# Patient Record
Sex: MALE | Race: WHITE | Employment: OTHER | URBAN - METROPOLITAN AREA
[De-identification: names, ages, dates, MRNs, and addresses within clinical notes are randomized per-mention and may not be internally consistent; named-entity substitution may affect disease eponyms.]

---

## 2022-09-16 ENCOUNTER — APPOINTMENT (OUTPATIENT)
Dept: GENERAL RADIOLOGY | Age: 57
End: 2022-09-16
Attending: STUDENT IN AN ORGANIZED HEALTH CARE EDUCATION/TRAINING PROGRAM
Payer: COMMERCIAL

## 2022-09-16 ENCOUNTER — APPOINTMENT (OUTPATIENT)
Dept: ULTRASOUND IMAGING | Age: 57
End: 2022-09-16
Attending: STUDENT IN AN ORGANIZED HEALTH CARE EDUCATION/TRAINING PROGRAM
Payer: COMMERCIAL

## 2022-09-16 ENCOUNTER — HOSPITAL ENCOUNTER (EMERGENCY)
Age: 57
Discharge: LEFT BEFORE DISCHARGE | End: 2022-09-16
Attending: STUDENT IN AN ORGANIZED HEALTH CARE EDUCATION/TRAINING PROGRAM | Admitting: STUDENT IN AN ORGANIZED HEALTH CARE EDUCATION/TRAINING PROGRAM
Payer: COMMERCIAL

## 2022-09-16 VITALS
RESPIRATION RATE: 16 BRPM | OXYGEN SATURATION: 98 % | HEART RATE: 79 BPM | SYSTOLIC BLOOD PRESSURE: 117 MMHG | HEIGHT: 68 IN | WEIGHT: 190 LBS | BODY MASS INDEX: 28.79 KG/M2 | DIASTOLIC BLOOD PRESSURE: 68 MMHG | TEMPERATURE: 98.7 F

## 2022-09-16 DIAGNOSIS — A41.9 SEVERE SEPSIS (HCC): ICD-10-CM

## 2022-09-16 DIAGNOSIS — R65.20 SEVERE SEPSIS (HCC): ICD-10-CM

## 2022-09-16 DIAGNOSIS — K81.9 CHOLECYSTITIS: Primary | ICD-10-CM

## 2022-09-16 DIAGNOSIS — R50.9 FEVER, UNSPECIFIED FEVER CAUSE: ICD-10-CM

## 2022-09-16 LAB
ALBUMIN SERPL-MCNC: 3.1 G/DL (ref 3.4–5)
ALBUMIN/GLOB SERPL: 0.9 {RATIO} (ref 0.8–1.7)
ALP SERPL-CCNC: 161 U/L (ref 45–117)
ALT SERPL-CCNC: 378 U/L (ref 16–61)
ANION GAP SERPL CALC-SCNC: 5 MMOL/L (ref 3–18)
APPEARANCE UR: CLEAR
AST SERPL-CCNC: 80 U/L (ref 10–38)
B PERT DNA SPEC QL NAA+PROBE: NOT DETECTED
BACTERIA URNS QL MICRO: ABNORMAL /HPF
BASOPHILS # BLD: 0 K/UL (ref 0–0.1)
BASOPHILS NFR BLD: 0 % (ref 0–2)
BILIRUB SERPL-MCNC: 0.9 MG/DL (ref 0.2–1)
BILIRUB UR QL: ABNORMAL
BORDETELLA PARAPERTUSSIS PCR, BORPAR: NOT DETECTED
BUN SERPL-MCNC: 13 MG/DL (ref 7–18)
BUN/CREAT SERPL: 13 (ref 12–20)
C PNEUM DNA SPEC QL NAA+PROBE: NOT DETECTED
CALCIUM SERPL-MCNC: 8.5 MG/DL (ref 8.5–10.1)
CHLORIDE SERPL-SCNC: 101 MMOL/L (ref 100–111)
CO2 SERPL-SCNC: 28 MMOL/L (ref 21–32)
COLOR UR: ABNORMAL
CREAT SERPL-MCNC: 1 MG/DL (ref 0.6–1.3)
DIFFERENTIAL METHOD BLD: ABNORMAL
EOSINOPHIL # BLD: 0 K/UL (ref 0–0.4)
EOSINOPHIL NFR BLD: 1 % (ref 0–5)
EPITH CASTS URNS QL MICRO: ABNORMAL /LPF (ref 0–5)
ERYTHROCYTE [DISTWIDTH] IN BLOOD BY AUTOMATED COUNT: 12.4 % (ref 11.6–14.5)
FLUAV SUBTYP SPEC NAA+PROBE: NOT DETECTED
FLUBV RNA SPEC QL NAA+PROBE: NOT DETECTED
GLOBULIN SER CALC-MCNC: 3.6 G/DL (ref 2–4)
GLUCOSE SERPL-MCNC: 208 MG/DL (ref 74–99)
GLUCOSE UR STRIP.AUTO-MCNC: 500 MG/DL
HADV DNA SPEC QL NAA+PROBE: NOT DETECTED
HCOV 229E RNA SPEC QL NAA+PROBE: NOT DETECTED
HCOV HKU1 RNA SPEC QL NAA+PROBE: NOT DETECTED
HCOV NL63 RNA SPEC QL NAA+PROBE: NOT DETECTED
HCOV OC43 RNA SPEC QL NAA+PROBE: NOT DETECTED
HCT VFR BLD AUTO: 43.4 % (ref 36–48)
HGB BLD-MCNC: 14.9 G/DL (ref 13–16)
HGB UR QL STRIP: NEGATIVE
HMPV RNA SPEC QL NAA+PROBE: NOT DETECTED
HPIV1 RNA SPEC QL NAA+PROBE: NOT DETECTED
HPIV2 RNA SPEC QL NAA+PROBE: NOT DETECTED
HPIV3 RNA SPEC QL NAA+PROBE: NOT DETECTED
HPIV4 RNA SPEC QL NAA+PROBE: NOT DETECTED
HYALINE CASTS URNS QL MICRO: ABNORMAL /LPF (ref 0–2)
IMM GRANULOCYTES # BLD AUTO: 0 K/UL (ref 0–0.04)
IMM GRANULOCYTES NFR BLD AUTO: 1 % (ref 0–0.5)
KETONES UR QL STRIP.AUTO: ABNORMAL MG/DL
LACTATE SERPL-SCNC: 0.7 MMOL/L (ref 0.4–2)
LEUKOCYTE ESTERASE UR QL STRIP.AUTO: ABNORMAL
LIPASE SERPL-CCNC: 534 U/L (ref 73–393)
LYMPHOCYTES # BLD: 0.6 K/UL (ref 0.9–3.6)
LYMPHOCYTES NFR BLD: 14 % (ref 21–52)
M PNEUMO DNA SPEC QL NAA+PROBE: NOT DETECTED
MCH RBC QN AUTO: 31.5 PG (ref 24–34)
MCHC RBC AUTO-ENTMCNC: 34.3 G/DL (ref 31–37)
MCV RBC AUTO: 91.8 FL (ref 78–100)
MONOCYTES # BLD: 0.4 K/UL (ref 0.05–1.2)
MONOCYTES NFR BLD: 10 % (ref 3–10)
MUCOUS THREADS URNS QL MICRO: ABNORMAL /LPF
NEUTS SEG # BLD: 3 K/UL (ref 1.8–8)
NEUTS SEG NFR BLD: 75 % (ref 40–73)
NITRITE UR QL STRIP.AUTO: NEGATIVE
NRBC # BLD: 0 K/UL (ref 0–0.01)
NRBC BLD-RTO: 0 PER 100 WBC
PH UR STRIP: 6 [PH] (ref 5–8)
PLATELET # BLD AUTO: 86 K/UL (ref 135–420)
PMV BLD AUTO: 9.2 FL (ref 9.2–11.8)
POTASSIUM SERPL-SCNC: 3.4 MMOL/L (ref 3.5–5.5)
PROT SERPL-MCNC: 6.7 G/DL (ref 6.4–8.2)
PROT UR STRIP-MCNC: 30 MG/DL
RBC # BLD AUTO: 4.73 M/UL (ref 4.35–5.65)
RBC #/AREA URNS HPF: ABNORMAL /HPF (ref 0–5)
RSV RNA SPEC QL NAA+PROBE: NOT DETECTED
RV+EV RNA SPEC QL NAA+PROBE: NOT DETECTED
SARS-COV-2 PCR, COVPCR: NOT DETECTED
SODIUM SERPL-SCNC: 134 MMOL/L (ref 136–145)
SP GR UR REFRACTOMETRY: >1.03 (ref 1–1.03)
TROPONIN-HIGH SENSITIVITY: <3 NG/L (ref 0–78)
UROBILINOGEN UR QL STRIP.AUTO: 2 EU/DL (ref 0.2–1)
WBC # BLD AUTO: 4 K/UL (ref 4.6–13.2)
WBC URNS QL MICRO: ABNORMAL /HPF (ref 0–5)

## 2022-09-16 PROCEDURE — 96368 THER/DIAG CONCURRENT INF: CPT

## 2022-09-16 PROCEDURE — 65270000029 HC RM PRIVATE

## 2022-09-16 PROCEDURE — 81001 URINALYSIS AUTO W/SCOPE: CPT

## 2022-09-16 PROCEDURE — 93005 ELECTROCARDIOGRAM TRACING: CPT

## 2022-09-16 PROCEDURE — 74011250636 HC RX REV CODE- 250/636

## 2022-09-16 PROCEDURE — 71046 X-RAY EXAM CHEST 2 VIEWS: CPT

## 2022-09-16 PROCEDURE — 85025 COMPLETE CBC W/AUTO DIFF WBC: CPT

## 2022-09-16 PROCEDURE — 76705 ECHO EXAM OF ABDOMEN: CPT

## 2022-09-16 PROCEDURE — 74011250636 HC RX REV CODE- 250/636: Performed by: STUDENT IN AN ORGANIZED HEALTH CARE EDUCATION/TRAINING PROGRAM

## 2022-09-16 PROCEDURE — 87040 BLOOD CULTURE FOR BACTERIA: CPT

## 2022-09-16 PROCEDURE — 83690 ASSAY OF LIPASE: CPT

## 2022-09-16 PROCEDURE — 74011000258 HC RX REV CODE- 258: Performed by: STUDENT IN AN ORGANIZED HEALTH CARE EDUCATION/TRAINING PROGRAM

## 2022-09-16 PROCEDURE — 87086 URINE CULTURE/COLONY COUNT: CPT

## 2022-09-16 PROCEDURE — 0202U NFCT DS 22 TRGT SARS-COV-2: CPT

## 2022-09-16 PROCEDURE — 84484 ASSAY OF TROPONIN QUANT: CPT

## 2022-09-16 PROCEDURE — 99285 EMERGENCY DEPT VISIT HI MDM: CPT

## 2022-09-16 PROCEDURE — 80053 COMPREHEN METABOLIC PANEL: CPT

## 2022-09-16 PROCEDURE — 96361 HYDRATE IV INFUSION ADD-ON: CPT

## 2022-09-16 PROCEDURE — 96365 THER/PROPH/DIAG IV INF INIT: CPT

## 2022-09-16 PROCEDURE — 83605 ASSAY OF LACTIC ACID: CPT

## 2022-09-16 RX ORDER — ATORVASTATIN CALCIUM 40 MG/1
TABLET, FILM COATED ORAL DAILY
COMMUNITY

## 2022-09-16 RX ORDER — ONDANSETRON 2 MG/ML
INJECTION INTRAMUSCULAR; INTRAVENOUS
Status: COMPLETED
Start: 2022-09-16 | End: 2022-09-16

## 2022-09-16 RX ORDER — AMLODIPINE BESYLATE 5 MG/1
5 TABLET ORAL DAILY
COMMUNITY

## 2022-09-16 RX ORDER — ONDANSETRON 2 MG/ML
4 INJECTION INTRAMUSCULAR; INTRAVENOUS
Status: COMPLETED | OUTPATIENT
Start: 2022-09-16 | End: 2022-09-16

## 2022-09-16 RX ORDER — METFORMIN HYDROCHLORIDE 500 MG/1
TABLET ORAL
COMMUNITY

## 2022-09-16 RX ORDER — VANCOMYCIN 2 GRAM/500 ML IN 0.9 % SODIUM CHLORIDE INTRAVENOUS
2000 ONCE
Status: DISCONTINUED | OUTPATIENT
Start: 2022-09-16 | End: 2022-09-16 | Stop reason: HOSPADM

## 2022-09-16 RX ORDER — LEVOFLOXACIN 5 MG/ML
750 INJECTION, SOLUTION INTRAVENOUS EVERY 24 HOURS
Status: DISCONTINUED | OUTPATIENT
Start: 2022-09-16 | End: 2022-09-16 | Stop reason: HOSPADM

## 2022-09-16 RX ORDER — ESCITALOPRAM OXALATE 5 MG/1
10 TABLET ORAL DAILY
COMMUNITY

## 2022-09-16 RX ADMIN — SODIUM CHLORIDE 1000 ML: 9 INJECTION, SOLUTION INTRAVENOUS at 10:10

## 2022-09-16 RX ADMIN — ONDANSETRON 4 MG: 2 INJECTION INTRAMUSCULAR; INTRAVENOUS at 08:08

## 2022-09-16 RX ADMIN — LEVOFLOXACIN 750 MG: 5 INJECTION, SOLUTION INTRAVENOUS at 10:10

## 2022-09-16 RX ADMIN — SODIUM CHLORIDE 1000 ML: 9 INJECTION, SOLUTION INTRAVENOUS at 07:54

## 2022-09-16 RX ADMIN — PIPERACILLIN AND TAZOBACTAM 4.5 G: 4; .5 INJECTION, POWDER, LYOPHILIZED, FOR SOLUTION INTRAVENOUS; PARENTERAL at 10:09

## 2022-09-16 NOTE — Clinical Note
Status[de-identified] INPATIENT [101]   Type of Bed: Surgical [18]   Inpatient Hospitalization Certified Necessary for the Following Reasons: 3.  Patient receiving treatment that can only be provided in an inpatient setting (further clarification in H&P documentation)   Admitting Diagnosis: Sepsis [facility]) [[id number]]   Admitting Diagnosis: Cholecystitis [834067]   Admitting Physician: Derrick Zamora   Attending Physician: Derrick Zamora   Estimated Length of Stay: 3-4 Midnights   Discharge Plan[de-identified] Home with Office Follow-up

## 2022-09-16 NOTE — ED NOTES
The patient is requesting to speak with the Medical Doctor at this time as he has questions and concerns about his possible surgery and treatment plan. This RN cannot locate Dr. Anthony Oden at this time. I will continue to update the patient as is appropriate to my scope of practice, in the meantime.

## 2022-09-16 NOTE — ED NOTES
This Rn attempted to call report at this time, I was placed on hold for 6 minutes and no one answered. ED charge RN is aware.

## 2022-09-16 NOTE — ED NOTES
This RN attempted to give report to the RN assigned to room 310. Per the unit secretary, the RN is currently unavailable and will call back to ED when she is available.

## 2022-09-16 NOTE — ED NOTES
1210: Was told by Nursing staff that patient walked out of the ER. Tried calling patient's phone at [phone] and left a message for call back. 1234: Pt called back. Discussed need for admission and possibly surgery. Pt is upset because there was no definitive plan for surgery in place. Discussed that prior to surgery, pt will need to be evaluated by surgeon as well as to be medically cleared. Discussed need to return to the ED for admission and IV antibiotics due to the concern for severe sepsis secondary to cholecystitis. Pt states that he is frustrated and would not be returning to the ED. Discussed risks of his not returning, pt states he understands and terminated the phone call.

## 2022-09-16 NOTE — PROGRESS NOTES
Consulted by ER at 11:08 for fever UKO with US findings of cholecystitis in a diabetic male. WBC 4 with left shift    Concern for acute cholecystitis. Informed ED of plan to go to the OR and called OR to post the case. Recalled ER to repeat EKG due to abnormal findings and get troponin for cardiac clearance for surgery. Dr. Max Simons responded she would. Came to ER at 12:32p from colonscopes to finish consenting as pt was to be pulled to OR by 2pm (tentative - add on an OR is full currently). OR was trying to facilitate staffing. Upon arrival at 12:32p was told pt just walked out of ER AMA. Said to ER team he's not waiting around he needs to know the exact time to go to surgery and \"to call him when the OR is ready and then he will come back in\". When Dr. Max Simons told her concerns of his possible serious illness and that this is the system. Pt stated he is not waiting, take the IV and threatened \"I'm just going to call Beena\" and she will fix if for him. Called OR at 12:42p and told them to stop rearranging the urgent staffing for the patient and cancel.

## 2022-09-16 NOTE — ED PROVIDER NOTES
EMERGENCY DEPARTMENT HISTORY AND PHYSICAL EXAM      Date: 9/16/2022  Patient Name: Deisy Rousseau      History of Presenting Illness     Chief Complaint   Patient presents with    Fever       Location/Duration/Severity/Modifying factors   Deisy Rousseau is a 59 y.o. male with a PMH Hypertension, hyperlipidemia, diabetes presents to the ER with 3 days of fever. Patient states that he has had a T-max of 102F at home, symptoms started 3 days ago with nausea and vomiting but he has been asymptomatic since then. Patient states that he was recently seen at his primary care provider 4 days ago and everything was normal.  For the last 3 days he had been taken Tylenol for his fever, last dose 0400 today. Otherwise he feels at baseline. Denies any cough, congestion, runny nose, sore throat, chest pain, difficulty breathing, abdominal pain, nausea, vomiting, diarrhea, focal neurological deficits. Fever   This is a new problem. The current episode started more than 2 days ago. The problem occurs constantly. The problem has not changed since onset. The maximum temperature noted was 102 - 102.9 F. Pertinent negatives include no chest pain, no sleepiness, no diarrhea, no vomiting, no congestion, no headaches, no sore throat, no muscle aches, no cough, no shortness of breath, no neck pain and no rash. He has tried acetaminophen for the symptoms. The treatment provided moderate relief. There are no other complaints, changes, or physical findings at this time.     PCP: Dorinda Swanson, DO    Current Facility-Administered Medications   Medication Dose Route Frequency Provider Last Rate Last Admin    levoFLOXacin (LEVAQUIN) 750 mg in D5W IVPB  750 mg IntraVENous Q24H [facility],  mL/hr at 09/16/22 1010 750 mg at 09/16/22 1010    vancomycin (VANCOCIN) 2000 mg in  ml infusion  2,000 mg IntraVENous ONCE [facility], Manhattandi Ok, DO         Current Outpatient Medications   Medication Sig Dispense Refill    amLODIPine (Norvasc) 5 mg tablet Take 5 mg by mouth daily. atorvastatin (LIPITOR) 40 mg tablet Take  by mouth daily. metFORMIN (GLUCOPHAGE) 500 mg tablet Take  by mouth daily (with breakfast). escitalopram oxalate (LEXAPRO) 5 mg tablet Take 10 mg by mouth daily. lisinopril (PRINIVIL, ZESTRIL) 40 mg tablet Take 40 mg by mouth daily. Past History     Past Medical History:  Past Medical History:   Diagnosis Date    Diabetes ([facility] [address]     Elevated PSA     Hyperlipidemia     Hypertension     Prostate cancer [facility])        Past Surgical History:  Past Surgical History:   Procedure Laterality Date    HX PROSTATECTOMY      HX VASECTOMY         Family History:  No family history on file. Social History:  Social History     Tobacco Use    Smoking status: Never   Substance Use Topics    Alcohol use: Yes     Alcohol/week: 0.8 standard drinks     Types: 1 Glasses of wine per week    Drug use: Never       Allergies:  No Known Allergies      Review of Systems     Review of Systems   Constitutional:  Positive for fever. HENT:  Negative for congestion and sore throat. Eyes:  Negative for visual disturbance. Respiratory:  Negative for cough and shortness of breath. Cardiovascular:  Negative for chest pain. Gastrointestinal:  Negative for diarrhea, nausea and vomiting. Genitourinary:  Negative for dysuria. Musculoskeletal:  Negative for neck pain. Skin:  Negative for rash. Neurological:  Negative for dizziness, weakness, numbness and headaches. All other systems reviewed and are negative. Physical Exam     Physical Exam  Vitals reviewed. Constitutional:       General: He is not in acute distress. Appearance: Normal appearance. He is not ill-appearing or toxic-appearing. HENT:      Head: Normocephalic and atraumatic. Right Ear: External ear normal.      Left Ear: External ear normal.      Nose: Nose normal.   Eyes:      Extraocular Movements: Extraocular movements intact.       Conjunctiva/sclera: Conjunctivae normal.      Pupils: Pupils are equal, round, and reactive to light. Cardiovascular:      Rate and Rhythm: Normal rate and regular rhythm. Pulses: Normal pulses. Heart sounds: Normal heart sounds. Pulmonary:      Effort: Pulmonary effort is normal.      Breath sounds: Normal breath sounds. Abdominal:      General: Abdomen is flat. Palpations: Abdomen is soft. Tenderness: There is no abdominal tenderness. There is no guarding. Musculoskeletal:         General: No deformity or signs of injury. Normal range of motion. Cervical back: Neck supple. Skin:     General: Skin is warm and dry. Capillary Refill: Capillary refill takes less than 2 seconds. Neurological:      General: No focal deficit present. Mental Status: He is alert and oriented to person, place, and time. Mental status is at baseline.    Psychiatric:         Mood and Affect: Mood normal.       Lab and Diagnostic Study Results     Diagnostic Study Results     Labs -     Recent Results (from the past 12 hour(s))   EKG, 12 LEAD, INITIAL    Collection Time: 09/16/22  7:39 AM   Result Value Ref Range    Ventricular Rate 74 BPM    Atrial Rate 74 BPM    P-R Interval 150 ms    QRS Duration 88 ms    Q-T Interval 346 ms    QTC Calculation (Bezet) 384 ms    Calculated P Axis 57 degrees    Calculated R Axis 12 degrees    Calculated T Axis 1 degrees    Diagnosis       Normal sinus rhythm  Septal infarct , age undetermined  Abnormal ECG  No previous ECGs available     CBC WITH AUTOMATED DIFF    Collection Time: 09/16/22  7:50 AM   Result Value Ref Range    WBC 4.0 (L) 4.6 - 13.2 K/uL    RBC 4.73 4.35 - 5.65 M/uL    HGB 14.9 13.0 - 16.0 g/dL    HCT 43.4 36.0 - 48.0 %    MCV 91.8 78.0 - 100.0 FL    MCH 31.5 24.0 - 34.0 PG    MCHC 34.3 31.0 - 37.0 g/dL    RDW 12.4 11.6 - 14.5 %    PLATELET 86 (L) 568 - 420 K/uL    MPV 9.2 9.2 - 11.8 FL    NRBC 0.0 0  WBC    ABSOLUTE NRBC 0.00 0.00 - 0.01 K/uL    NEUTROPHILS 75 (H) 40 - 73 %    LYMPHOCYTES 14 (L) 21 - 52 %    MONOCYTES 10 3 - 10 %    EOSINOPHILS 1 0 - 5 %    BASOPHILS 0 0 - 2 %    IMMATURE GRANULOCYTES 1 (H) 0.0 - 0.5 %    ABS. NEUTROPHILS 3.0 1.8 - 8.0 K/UL    ABS. LYMPHOCYTES 0.6 (L) 0.9 - 3.6 K/UL    ABS. MONOCYTES 0.4 0.05 - 1.2 K/UL    ABS. EOSINOPHILS 0.0 0.0 - 0.4 K/UL    ABS. BASOPHILS 0.0 0.0 - 0.1 K/UL    ABS. IMM. GRANS. 0.0 0.00 - 0.04 K/UL    DF AUTOMATED     METABOLIC PANEL, COMPREHENSIVE    Collection Time: 09/16/22  7:50 AM   Result Value Ref Range    Sodium 134 (L) 136 - 145 mmol/L    Potassium 3.4 (L) 3.5 - 5.5 mmol/L    Chloride 101 100 - 111 mmol/L    CO2 28 21 - 32 mmol/L    Anion gap 5 3.0 - 18 mmol/L    Glucose 208 (H) 74 - 99 mg/dL    BUN 13 7.0 - 18 MG/DL    Creatinine 1.00 0.6 - 1.3 MG/DL    BUN/Creatinine ratio 13 12 - 20      GFR est AA >60 >60 ml/min/1.73m2    GFR est non-AA >60 >60 ml/min/1.73m2    Calcium 8.5 8.5 - 10.1 MG/DL    Bilirubin, total 0.9 0.2 - 1.0 MG/DL    ALT (SGPT) 378 (H) 16 - 61 U/L    AST (SGOT) 80 (H) 10 - 38 U/L    Alk.  phosphatase 161 (H) 45 - 117 U/L    Protein, total 6.7 6.4 - 8.2 g/dL    Albumin 3.1 (L) 3.4 - 5.0 g/dL    Globulin 3.6 2.0 - 4.0 g/dL    A-G Ratio 0.9 0.8 - 1.7     LIPASE    Collection Time: 09/16/22  7:50 AM   Result Value Ref Range    Lipase 534 (H) 73 - 393 U/L   URINALYSIS W/ RFLX MICROSCOPIC    Collection Time: 09/16/22  8:35 AM   Result Value Ref Range    Color DARK YELLOW      Appearance CLEAR      Specific gravity >1.030 (H) 1.003 - 1.030    pH (UA) 6.0 5.0 - 8.0      Protein 30 (A) NEG mg/dL    Glucose 500 (A) NEG mg/dL    Ketone TRACE (A) NEG mg/dL    Bilirubin SMALL (A) NEG      Blood Negative NEG      Urobilinogen 2.0 (H) 0.2 - 1.0 EU/dL    Nitrites Negative NEG      Leukocyte Esterase TRACE (A) NEG     URINE MICROSCOPIC ONLY    Collection Time: 09/16/22  8:35 AM   Result Value Ref Range    WBC 0 to 3 0 - 5 /hpf    RBC 0 to 3 0 - 5 /hpf    Epithelial cells FEW 0 - 5 /lpf    Bacteria 1+ (A) NEG /hpf    Mucus 1+ (A) NEG /lpf    Hyaline cast 4 to 10 0 - 2 /lpf   LACTIC ACID    Collection Time: 09/16/22 10:35 AM   Result Value Ref Range    Lactic acid 0.7 0.4 - 2.0 MMOL/L       Radiologic Studies -  US GALLBLADDER   Final Result         1. Cholelithiasis. Mild diffuse gallbladder wall thickening which is a   nonspecific finding and may be chronic, especially in the absence of gallbladder   wall edema or pericholecystic fluid, making acute cholecystitis unlikely along   with the fact that the sonographic Patino's sign is negative per the   sonographer. 2. Poorly visualized pancreas. XR CHEST PA LAT   Final Result      No active cardiopulmonary disease. CT Results  (Last 48 hours)      None          CXR Results  (Last 48 hours)                 09/16/22 0905  XR CHEST PA LAT Final result    Impression:      No active cardiopulmonary disease. Narrative:  EXAM: Chest Radiography       CLINICAL INDICATION/HISTORY: Fever;      > Additional: None       COMPARISON: 9/25/2014       TECHNIQUE: PA and lateral       _______________       FINDINGS:       HEART AND MEDIASTINUM: Normal heart size with normal mediastinal contours. LUNGS AND PLEURAL SPACES: Clear.        BONY THORAX AND SOFT TISSUES: Unremarkable.       _______________                   Medications given in the ED-  Medications   levoFLOXacin (LEVAQUIN) 750 mg in D5W IVPB (750 mg IntraVENous New Bag 9/16/22 1010)   vancomycin (VANCOCIN) 2000 mg in  ml infusion (has no administration in time range)   sodium chloride 0.9 % bolus infusion 1,000 mL (0 mL IntraVENous IV Completed 9/16/22 1042)   ondansetron (ZOFRAN) injection 4 mg (4 mg IntraVENous Given 9/16/22 0808)   sodium chloride 0.9 % bolus infusion 1,000 mL (1,000 mL IntraVENous New Bag 9/16/22 1010)   piperacillin-tazobactam (ZOSYN) 4.5 g in 0.9% sodium chloride (MBP/ADV) 100 mL MBP (0 g IntraVENous IV Completed 9/16/22 1042)           Medical Decision Making and ED Course - I am the first and primary provider for this patient AND AM THE PRIMARY PROVIDER OF RECORD. - I reviewed the vital signs, available nursing notes, past medical history, past surgical history, family history and social history. - Initial assessment performed. The patients presenting problems have been discussed, and the staff are in agreement with the care plan formulated and outlined with them. I have encouraged them to ask questions as they arise throughout their visit. Vital Signs-Reviewed the patient's vital signs. Patient Vitals for the past 12 hrs:   Temp Pulse Resp BP SpO2   09/16/22 1131 98.7 °F (37.1 °C) 79 -- 117/68 98 %   09/16/22 1046 -- 82 16 103/65 98 %   09/16/22 0831 -- 72 16 122/79 100 %   09/16/22 0805 98.9 °F (37.2 °C) -- -- -- --   09/16/22 0746 -- 81 -- 129/85 97 %   09/16/22 0650 98.7 °F (37.1 °C) -- -- -- --   09/16/22 0639 98.4 °F (36.9 °C) 84 16 122/68 96 %       EKG interpretation: Normal sinus rhythm at 74 bpm, QTC is 384, no ST elevations or depressions, no STEMI - NN 7:43 AM    Records Reviewed: Nursing Notes        Provider Notes (Medical Decision Making):     MDM  Number of Diagnoses or Management Options  Cholecystitis  Fever, unspecified fever cause  Severe sepsis ([facility] [address]  Diagnosis management comments: Well-appearing male in no acute distress presents to the ER with fever x3 days, no other symptoms. We will do basic labs, chest x-ray, viral panel, UA to look for source of infection and reassess. 0930 AM: Patient noted to have leukopenia, thrombocytopenia and elevated LFTs concerning for severe sepsis with no clear source. IV antibiotics were essentially needed. Right upper quadrant ultrasound was ordered for his elevated LFTs. Patient reassessed at bedside, acute distress at this time. Updated on lab results. 1100 AM: Patient noted to have stones at the gallbladder neck with gallbladder edema.   Discussed case with general surgery for possible sepsis secondary to cholecystitis. Patient was accepted to general surgery floors for cholecystectomy and IV antibiotics. Transfer of care was performed. Patient updated on lab and recommendations, all questions answered to patient's understanding. Patient agreed to the admission. ED Course:   7:32 AM Initial assessment performed. The patients presenting problems have been discussed, and they are in agreement with the care plan formulated and outlined with them. I have encouraged them to ask questions as they arise throughout their visit. Consultations:       Consultations: - General Surgery Consultant: Dr. Libertad Anand: We have asked for emergent assistance with regard to this patient. We have discussed the patients HPI, ROS, PE and results this far. They will come and evaluate the patient for their acute surgical needs and further treatment with possible admission. Procedures and Critical Care       CRITICAL CARE NOTE :  7:32 AM    Critical Care Time:   I have spent 45 minutes of critical care time involved in lab review, consultations with specialist, family decision-making, and documentation. This time does not include time spent on separately billable procedures. During this entire length of time I was immediately available to the patient. Critical Care: The reason for providing this level of medical care for this critically ill patient was due a critical illness that impaired one or more vital organ systems such that there was a high probability of imminent or life threatening deterioration in the patients condition. This care involved high complexity decision making to assess, manipulate, and support vital system functions, to treat this degreee vital organ system failure and to prevent further life threatening deterioration of the patients condition. Barbara Vasquez DO        Disposition     Admitted    ADMIT NOTE:    Kelsea Jones  results have been reviewed with him.   He has been counseled regarding his diagnosis, treatment, and plan as well as my recommendation for admission and the reasons why. He verbally conveys understanding and agreement of the signs, symptoms, diagnosis, treatment and prognosis and additionally agrees with the plan. Case discussed with admitting physician and any consultants on board. Bed type, ED treatment and further ED workup decided by joint plan of care made in consensus with admitting physician and any consultants, including what management will be deferred to the inpatient setting      Diagnosis     CLINICAL IMPRESSION:    1. Cholecystitis    2. Fever, unspecified fever cause    3. Severe sepsis ([address]        PLAN:  1. Admit  2. Current Discharge Medication List        3. Follow-up Information       Follow up With Specialties Details Why [address], [address] In 1 week  [address] [place] [address]  [phone]              Attestations:    Buddy Dominique, DO    Please note that this dictation was completed with [employer], the computer voice recognition software. Quite often unanticipated grammatical, syntax, homophones, and other interpretive errors are inadvertently transcribed by the computer software. Please disregard these errors. Please excuse any errors that have escaped final proofreading. Thank you.

## 2022-09-16 NOTE — ED NOTES
The patient came to the door of his room at this time and asked if I could come remove his IV as he is \"ready to go\". This RN said ' yes sir, I'll be right there to help you.'     When I entered the room the patient expressed frustration over not being updated by the MD about the plan of action. This RN updated the Patient, for the second time, about the plan of care at this time. He knew he had a bed assigned to the OR, as I previously updated him on that occurrence. The patient was later informed that his bed assignment was changed from the OR to the medical floor and that he would be going upstairs shortly after report was called. The patient wanted to know the details of his surgery and requested that the MD update him on the details of the treatment plan. The patient stated that he \"requested to speak with the ED MD 3 times within the past hour\" and has not been updated. This RN was made aware of one of those request, Dr. Homero Monterroso at that time, was not at the desk to inform her of the patients request.     The patient requested that I remove his IV \"now\" as he was going \"home\". This RN removed the patients IV. The patient refused the AMA form and stated he wanted to [facility]". The patient left the ER AOx4 with stable vitals and he was updated by this RN on the dangers of leaving without his treatment complete. The patient stated he verbally understood and left the ER.

## 2022-09-16 NOTE — ED NOTES
The patients bed assignment changed at 1139 from room 208 to room 310. This RN attempted to call report at this time as the bed assignment timer shows the bed has been assigned for 22 minutes. The RN on the medical floor was unavailable to take report and asked that I call back in five minutes. This RN will attempt 1x to call report again.

## 2022-09-16 NOTE — PROGRESS NOTES
Consulted at 11:08 for fever UKO with US findings of cholecystitis in a diabetic male. WBC 4 with left shift    Concern for acute cholecystitis. Informed ED of plan to go to the OR and called OR to post the case. Recalled ER to repeat EKG due to abnormal findings and get troponin for cardiac clearance for surgery. Dr. Eagle Squires responded she would. Came to ER at 12:40p from scopes to finish consenting as pt was to be pulled to OR by 2pm (tentative - add on an OR is full currently). OR was trying to facilitate staffing. Upon arrival at 12:40 was told pt just walked out of ER AMA. Said to ER team he's not waiting around and \"to call him when the OR is ready and then he will come back in\". When Dr. Eagle Squires told her concerns of his possible serious illness and that this is the system. Pt stated he is not waiting and he will just \"call Beena\" and she will fix if for him. Called OR and told them to stop rearranging the urgent staffing for the patient and cancel.

## 2022-09-16 NOTE — ED TRIAGE NOTES
PT c/o of fever/chills for the past 3 days. Denies any other symptoms. Taking Tylenol which helps. Last dose 0400.
normal

## 2022-09-17 LAB
BACTERIA SPEC CULT: NORMAL
SERVICE CMNT-IMP: NORMAL

## 2022-09-18 LAB
ATRIAL RATE: 74 BPM
CALCULATED P AXIS, ECG09: 57 DEGREES
CALCULATED R AXIS, ECG10: 12 DEGREES
CALCULATED T AXIS, ECG11: 1 DEGREES
DIAGNOSIS, 93000: NORMAL
P-R INTERVAL, ECG05: 150 MS
Q-T INTERVAL, ECG07: 346 MS
QRS DURATION, ECG06: 88 MS
QTC CALCULATION (BEZET), ECG08: 384 MS
VENTRICULAR RATE, ECG03: 74 BPM

## 2022-09-22 LAB
BACTERIA SPEC CULT: NORMAL
BACTERIA SPEC CULT: NORMAL
SERVICE CMNT-IMP: NORMAL
SERVICE CMNT-IMP: NORMAL